# Patient Record
Sex: FEMALE | Race: WHITE | NOT HISPANIC OR LATINO | URBAN - METROPOLITAN AREA
[De-identification: names, ages, dates, MRNs, and addresses within clinical notes are randomized per-mention and may not be internally consistent; named-entity substitution may affect disease eponyms.]

---

## 2017-07-22 ENCOUNTER — EMERGENCY (EMERGENCY)
Facility: HOSPITAL | Age: 47
LOS: 1 days | End: 2017-07-22
Payer: COMMERCIAL

## 2017-07-22 PROCEDURE — 71010: CPT | Mod: 26

## 2017-07-22 PROCEDURE — 27808 TREATMENT OF ANKLE FRACTURE: CPT | Mod: 54

## 2017-07-22 PROCEDURE — 73590 X-RAY EXAM OF LOWER LEG: CPT | Mod: 26,RT

## 2017-07-22 PROCEDURE — 99284 EMERGENCY DEPT VISIT MOD MDM: CPT | Mod: 25

## 2019-11-15 PROBLEM — Z00.00 ENCOUNTER FOR PREVENTIVE HEALTH EXAMINATION: Status: ACTIVE | Noted: 2019-11-15

## 2020-05-06 ENCOUNTER — APPOINTMENT (OUTPATIENT)
Dept: ENDOCRINOLOGY | Facility: CLINIC | Age: 50
End: 2020-05-06
Payer: COMMERCIAL

## 2020-05-06 DIAGNOSIS — Z82.69 FAMILY HISTORY OF OTHER DISEASES OF THE MUSCULOSKELETAL SYSTEM AND CONNECTIVE TISSUE: ICD-10-CM

## 2020-05-06 PROCEDURE — 99205 OFFICE O/P NEW HI 60 MIN: CPT | Mod: 95

## 2020-05-06 NOTE — HISTORY OF PRESENT ILLNESS
[Teriparatide (Forteo)] : Teriparatide [Raloxifene (Evista)] : Raloxifene [FreeTextEntry1] : 48 yo female in generally good health. Had traumatic tib/fib fx 2017, ORIF at  [facility]. Eval by Dr Cassius Torres: osteoporosis  Treated with Forteo for 18 months. Repeat BMD reported as no improvement:\par BMD 2019 ZP Current images reviewed on-line; prior not available\par spine -2.8, prior -2/7\par Neck -2.7  prior -2/4\par total hip -3.1, prior -2.6 \par Pt then switched to Evista, partly for breast issues. Tolerating well, some mild hot flashes, no leg cramps or DVT. \par No unusual risk factors. Has one almond milk per day. + exercise\par Submitted labs 9/2019: subclinical hypothyroidism, no h/o prior thyroid disease, goiter, FHx, exposure to amio or lithium. No thyroid sx. TSH 6.47, normal free T4.  [Disordered Eating] : no history of disordered eating [Taking Steroids] : no history of taking steroids [Hyperparathyroidism] : no history of hyperparathyroidism [Diabetes] : no history of diabetes [Kidney Stones] : no history of kidney stones [Family History of Breast Cancer] : no family history of breast cancer [Family History of Hip Fracture] : no family history of hip fracture [Family History of Osteoporosis] : no family history of osteoporosis [History of Radiation Therapy] : no history of radiation therapy [History of Blood Clots] : no history of blood clots [High Fall Risk] : no fall risk

## 2020-05-06 NOTE — REASON FOR VISIT
[Consultation] : a consultation visit [Osteoporosis] : osteoporosis [FreeTextEntry2] : Katlin Soriano MD

## 2020-05-06 NOTE — ASSESSMENT
Last visit 04/11/2018   Next visit 07/26/2018. Noted that UDS is needed. [Bisphosphonate Therapy] : Risks and benefits of bisphosphonate therapy were  discussed with the patient including gastroesophageal irritation, osteonecrosis of the jaw, and atypical femur fractures, and acute phase reaction [Raloxifene Therapy] : Risks and benefits of Raloxifene therapy were discussed with the patient including blood clots, hot flashes, and cramps [FreeTextEntry1] : 50 yo female presents with h/o traumatic tib / fib fx, treated with Forteo for 18 months then changed to Evista. BMD 2019 showed possible decrease vs 2017. \par The patient was advised of the   risk of future fracture. Evista is not effective to treat hip osteoporosis  options reviewed. \par Pt elects to try Actonel 150 mg monthly. \par Can continue Evista for breast cancer prevention \par Recommend calcium 500 mg Vit D 2000\par \par Labs show subclinical hypothyroidism, no prior Dx. Cannot examine pt. Repeat TFTs with abs. No clear indication for Rx if stable. \par Pharmacological Management of Osteoporosis in Postmenopausal Women: An Endocrine Society  Clinical Practice Guideline. Tarun R, Dedrick CJ., Black DM, Roper AM, Murshaheed MH, El D. J Clin Endocrinol Metab. 2019  May 1;104(5):3105-3621 \par Thyroid hormones treatment for subclinical hypothyroidism:a clinical practice guideline.  MEHNAZ Bustillo  \par BMJ 2019;365:l2006

## 2020-05-06 NOTE — CONSULT LETTER
[Dear  ___] : Dear  [unfilled], [Consult Letter:] : I had the pleasure of evaluating your patient, [unfilled]. [Please see my note below.] : Please see my note below. [Consult Closing:] : Thank you very much for allowing me to participate in the care of this patient.  If you have any questions, please do not hesitate to contact me. [Sincerely,] : Sincerely, [FreeTextEntry2] : Katlin Soriano MD

## 2020-08-22 ENCOUNTER — TRANSCRIPTION ENCOUNTER (OUTPATIENT)
Age: 50
End: 2020-08-22

## 2020-10-12 ENCOUNTER — TRANSCRIPTION ENCOUNTER (OUTPATIENT)
Age: 50
End: 2020-10-12

## 2020-11-18 ENCOUNTER — TRANSCRIPTION ENCOUNTER (OUTPATIENT)
Age: 50
End: 2020-11-18

## 2020-12-29 ENCOUNTER — APPOINTMENT (OUTPATIENT)
Dept: ENDOCRINOLOGY | Facility: CLINIC | Age: 50
End: 2020-12-29
Payer: COMMERCIAL

## 2020-12-29 VITALS
TEMPERATURE: 98.6 F | OXYGEN SATURATION: 98 % | DIASTOLIC BLOOD PRESSURE: 70 MMHG | HEIGHT: 60 IN | WEIGHT: 105 LBS | HEART RATE: 85 BPM | SYSTOLIC BLOOD PRESSURE: 90 MMHG | BODY MASS INDEX: 20.62 KG/M2

## 2020-12-29 PROCEDURE — 99072 ADDL SUPL MATRL&STAF TM PHE: CPT

## 2020-12-29 PROCEDURE — ZZZZZ: CPT

## 2020-12-29 PROCEDURE — 77080 DXA BONE DENSITY AXIAL: CPT

## 2020-12-29 PROCEDURE — 99214 OFFICE O/P EST MOD 30 MIN: CPT | Mod: 25

## 2020-12-29 RX ORDER — NITROFURANTOIN (MONOHYDRATE/MACROCRYSTALS) 25; 75 MG/1; MG/1
100 CAPSULE ORAL
Qty: 10 | Refills: 0 | Status: COMPLETED | COMMUNITY
Start: 2020-12-27

## 2020-12-29 RX ORDER — CHOLECALCIFEROL (VITAMIN D3) 50 MCG
50 MCG TABLET ORAL
Refills: 0 | Status: ACTIVE | COMMUNITY

## 2020-12-29 RX ORDER — TOCOPHERSOLAN (VITAMIN E TPGS) 400/15ML
LIQUID (ML) ORAL
Refills: 0 | Status: ACTIVE | COMMUNITY

## 2020-12-29 RX ORDER — ONDANSETRON 4 MG/1
4 TABLET ORAL
Qty: 6 | Refills: 0 | Status: COMPLETED | COMMUNITY
Start: 2020-08-09

## 2021-05-24 NOTE — ADDENDUM
[FreeTextEntry1] : May 24, 2021\par Telephone\par Patient reports not tolerating Actonel having symptoms including upper GI symptoms and acute phase reaction-like symptoms every month.  Recommend switch to Prolia. Enrique Diaz MD

## 2021-05-24 NOTE — REASON FOR VISIT
[Follow - Up] : a follow-up visit [Osteoporosis] : osteoporosis [FreeTextEntry2] : initial consult was telehealth

## 2021-05-24 NOTE — PROCEDURE
[FreeTextEntry1] : Bone mineral density December 29, 2020\par Compared to outside study February 2019\par Spine -2.6 osteoporosis prior -2.5\par Total hip -3.2 osteoporosis prior -3.4\par Femoral neck -3.2 osteoporosis prior -2.7 change in position region of analysis\par Proximal radius -3.7, severe osteoporosis no prior

## 2021-05-24 NOTE — ASSESSMENT
[Bisphosphonate Therapy] : Risks and benefits of bisphosphonate therapy were  discussed with the patient including gastroesophageal irritation, osteonecrosis of the jaw, and atypical femur fractures, and acute phase reaction [Raloxifene Therapy] : Risks and benefits of Raloxifene therapy were discussed with the patient including blood clots, hot flashes, and cramps [FreeTextEntry1] : 49 yo female presents with h/o traumatic tib / fib fx, treated with Forteo for 18 months then changed to Evista. BMD 2019 showed possible decrease vs 2017. \par The patient was advised of the   risk of future fracture. Evista is not effective to treat hip osteoporosis  options reviewed. \par Pt began  Actonel 150 mg monthly. taking correctly tolerating well No UGI sx. No thigh pain. Last DDS few mos ago. No interval fractures. \par On mineral density 2020 shows significant osteoporosis with severe low bone density proximal radius no prior\par Can continue Evista for breast cancer prevention \par Recommend calcium 500 mg Vit D 2000\par \par Labs show subclinical hypothyroidism, no prior Dx. Cannot examine pt. Repeat TFTs  normal  . No clear indication for Rx  . \par Pharmacological Management of Osteoporosis in Postmenopausal Women: An Endocrine Society  Clinical Practice Guideline. Tarun R, Dedrick SALAZAR., Tito DM, Jacquelin AM, Vicky MH, El D. J Clin Endocrinol Metab. 2019  May 1;104(5):8272-7122 \par Thyroid hormones treatment for subclinical hypothyroidism:a clinical practice guideline.  MEHNAZ Bustillo  \par BMJ 2019;365:l2006

## 2021-05-24 NOTE — HISTORY OF PRESENT ILLNESS
[Teriparatide (Forteo)] : Teriparatide [Raloxifene (Evista)] : Raloxifene [FreeTextEntry1] : Began Actonel. taking correctly tolerating well No UGI sx. No thigh pain. Last DDS few mos ago. No interval fractures. \par Had traumatic tib/fib fx 2017, ORIF at  [facility]". Eval by Dr Cassius Torres: osteoporosis  Treated with Forteo for 18 months. Repeat BMD reported as no improvement:\par BMD 2019 ZP Current images reviewed on-line; prior not available\par spine -2.8, prior -2/7\par Neck -2.7  prior -2/4\par total hip -3.1, prior -2.6 \par Pt then switched to Evista, partly for breast issues. Tolerating well, some mild hot flashes, no leg cramps or DVT. \par No unusual risk factors. Has one almond milk per day. + exercise\par Submitted labs 9/2019: subclinical hypothyroidism, no h/o prior thyroid disease, goiter, FHx, exposure to amio or lithium. No thyroid sx. TSH 6.47, normal free T4.  Repeat TSH 8/2020 normal  [Disordered Eating] : no history of disordered eating [Taking Steroids] : no history of taking steroids [Hyperparathyroidism] : no history of hyperparathyroidism [Diabetes] : no history of diabetes [Kidney Stones] : no history of kidney stones [Family History of Breast Cancer] : no family history of breast cancer [Family History of Hip Fracture] : no family history of hip fracture [Family History of Osteoporosis] : no family history of osteoporosis [History of Radiation Therapy] : no history of radiation therapy [History of Blood Clots] : no history of blood clots [High Fall Risk] : no fall risk

## 2021-05-24 NOTE — PHYSICAL EXAM
[Alert] : alert [Well Nourished] : well nourished [No Acute Distress] : no acute distress [Well Developed] : well developed [Normal Sclera/Conjunctiva] : normal sclera/conjunctiva [EOMI] : extra ocular movement intact [No Proptosis] : no proptosis [Thyroid Not Enlarged] : the thyroid was not enlarged [No Thyroid Nodules] : no palpable thyroid nodules [Clear to Auscultation] : lungs were clear to auscultation bilaterally [Normal S1, S2] : normal S1 and S2 [Normal Rate] : heart rate was normal [Regular Rhythm] : with a regular rhythm [No Edema] : no peripheral edema [Normal Bowel Sounds] : normal bowel sounds [Not Tender] : non-tender [Not Distended] : not distended [Soft] : abdomen soft [Normal Anterior Cervical Nodes] : no anterior cervical lymphadenopathy [Normal Posterior Cervical Nodes] : no posterior cervical lymphadenopathy [No Spinal Tenderness] : no spinal tenderness [Spine Straight] : spine straight [No Stigmata of Cushings Syndrome] : no stigmata of Cushings Syndrome [Normal Gait] : normal gait [No Rash] : no rash [Normal Reflexes] : deep tendon reflexes were 2+ and symmetric [No Tremors] : no tremors [Oriented x3] : oriented to person, place, and time [Acanthosis Nigricans] : no acanthosis nigricans

## 2021-07-23 ENCOUNTER — TRANSCRIPTION ENCOUNTER (OUTPATIENT)
Age: 51
End: 2021-07-23

## 2021-07-23 ENCOUNTER — APPOINTMENT (OUTPATIENT)
Dept: ENDOCRINOLOGY | Facility: CLINIC | Age: 51
End: 2021-07-23
Payer: COMMERCIAL

## 2021-07-23 PROCEDURE — 99072 ADDL SUPL MATRL&STAF TM PHE: CPT

## 2021-07-23 PROCEDURE — 96401 CHEMO ANTI-NEOPL SQ/IM: CPT

## 2021-07-23 RX ADMIN — DENOSUMAB 0 MG/ML: 60 INJECTION SUBCUTANEOUS at 00:00

## 2021-07-26 ENCOUNTER — TRANSCRIPTION ENCOUNTER (OUTPATIENT)
Age: 51
End: 2021-07-26

## 2021-07-26 LAB
25(OH)D3 SERPL-MCNC: 57.1 NG/ML
ALBUMIN SERPL ELPH-MCNC: 4.5 G/DL
ALP BLD-CCNC: 64 U/L
ALT SERPL-CCNC: 18 U/L
ANION GAP SERPL CALC-SCNC: 11 MMOL/L
AST SERPL-CCNC: 23 U/L
BILIRUB SERPL-MCNC: 0.4 MG/DL
BUN SERPL-MCNC: 15 MG/DL
CALCIUM SERPL-MCNC: 9.5 MG/DL
CHLORIDE SERPL-SCNC: 101 MMOL/L
CO2 SERPL-SCNC: 26 MMOL/L
CREAT SERPL-MCNC: 0.75 MG/DL
GLUCOSE SERPL-MCNC: 69 MG/DL
POTASSIUM SERPL-SCNC: 4.5 MMOL/L
PROT SERPL-MCNC: 6.7 G/DL
SODIUM SERPL-SCNC: 138 MMOL/L

## 2021-07-26 RX ORDER — RALOXIFENE HYDROCHLORIDE 60 MG/1
60 TABLET, FILM COATED ORAL DAILY
Qty: 90 | Refills: 3 | Status: ACTIVE | COMMUNITY
Start: 1900-01-01 | End: 1900-01-01

## 2022-02-01 ENCOUNTER — LABORATORY RESULT (OUTPATIENT)
Age: 52
End: 2022-02-01

## 2022-02-01 ENCOUNTER — APPOINTMENT (OUTPATIENT)
Dept: ENDOCRINOLOGY | Facility: CLINIC | Age: 52
End: 2022-02-01
Payer: COMMERCIAL

## 2022-02-01 VITALS
TEMPERATURE: 98.2 F | BODY MASS INDEX: 19.92 KG/M2 | SYSTOLIC BLOOD PRESSURE: 98 MMHG | HEART RATE: 82 BPM | DIASTOLIC BLOOD PRESSURE: 58 MMHG | OXYGEN SATURATION: 98 % | WEIGHT: 102 LBS

## 2022-02-01 DIAGNOSIS — E03.8 OTHER SPECIFIED HYPOTHYROIDISM: ICD-10-CM

## 2022-02-01 DIAGNOSIS — Z91.89 OTHER SPECIFIED PERSONAL RISK FACTORS, NOT ELSEWHERE CLASSIFIED: ICD-10-CM

## 2022-02-01 PROCEDURE — 99214 OFFICE O/P EST MOD 30 MIN: CPT | Mod: 25

## 2022-02-01 PROCEDURE — 96401 CHEMO ANTI-NEOPL SQ/IM: CPT

## 2022-02-01 RX ORDER — RISEDRONATE SODIUM 150 MG/1
150 TABLET, FILM COATED ORAL
Qty: 3 | Refills: 3 | Status: DISCONTINUED | COMMUNITY
Start: 2020-05-06 | End: 2022-02-01

## 2022-02-01 RX ORDER — DENOSUMAB 60 MG/ML
60 INJECTION SUBCUTANEOUS
Qty: 1 | Refills: 0 | Status: COMPLETED | OUTPATIENT
Start: 2022-02-01

## 2022-02-01 RX ADMIN — DENOSUMAB 0 MG/ML: 60 INJECTION SUBCUTANEOUS at 00:00

## 2022-02-01 NOTE — HISTORY OF PRESENT ILLNESS
[Risedronate (Actonel)] : Risedronate [Teriparatide (Forteo)] : Teriparatide [Denosumab (Prolia)] : Denosumab [Raloxifene (Evista)] : Raloxifene [FreeTextEntry1] : No significant interval health changes. No interval surgery, hospitalizations, fractures, or change in medications.\par \par Pt began Actonel 5/2020. Took correctly, but had APR. BMD 12/2020 shows significant osteoporosis with severe low bone density proximal radius no prior. Pt transitioned to Prolia 7/2021. Tolerating well. No thigh pain, no interval fx. Normal Ca. Last DDS within past 6 months. No ONJ. Not planning major dental work.\par \par Had traumatic tib/fib fx 2017, [facility] at  [facility] Eval by Dr Cassius Torres: osteoporosis  Treated with Forteo for 18 months. Repeat BMD reported as no improvement:\par BMD 2019 ZP Current images reviewed on-line; prior not available\par spine -2.8, prior -2/7\par Neck -2.7  prior -2/4\par total hip -3.1, prior -2.6 \par Pt then switched to Evista, partly for breast issues. Taking correctly, tolerating well. No hot flashes, no leg cramps, no DVT. No interval fx. No unusual risk factors. Has one almond milk per day. + exercise\par \par Submitted labs 9/2019: subclinical hypothyroidism, no h/o prior thyroid disease, goiter, FHx, exposure to amio or lithium. No thyroid sx. TSH 6.47, normal free T4. Repeat TSH 8/2020 normal  [Disordered Eating] : no history of disordered eating [Taking Steroids] : no history of taking steroids [Hyperparathyroidism] : no history of hyperparathyroidism [Diabetes] : no history of diabetes [Kidney Stones] : no history of kidney stones [Family History of Breast Cancer] : no family history of breast cancer [Family History of Hip Fracture] : no family history of hip fracture [Family History of Osteoporosis] : no family history of osteoporosis [History of Radiation Therapy] : no history of radiation therapy [History of Blood Clots] : no history of blood clots [High Fall Risk] : no fall risk

## 2022-02-01 NOTE — END OF VISIT
[FreeTextEntry3] : I, Eamon Hameed, authored this note working as a medical scribe for Dr. Diaz.  02/01/2022. 10:45AM. This note was authored by the medical scribe for me. I have reviewed, edited, and revised the note as needed. I am in agreement with the exam findings, imaging findings, and treatment plan.  Enrique Diaz MD

## 2022-02-02 LAB
25(OH)D3 SERPL-MCNC: 35 NG/ML
ALBUMIN SERPL ELPH-MCNC: 4.5 G/DL
ALP BLD-CCNC: 66 U/L
ALT SERPL-CCNC: 14 U/L
ANION GAP SERPL CALC-SCNC: 13 MMOL/L
AST SERPL-CCNC: 23 U/L
BILIRUB SERPL-MCNC: 0.4 MG/DL
BUN SERPL-MCNC: 17 MG/DL
CALCIUM SERPL-MCNC: 8.6 MG/DL
CHLORIDE SERPL-SCNC: 103 MMOL/L
CO2 SERPL-SCNC: 25 MMOL/L
CREAT SERPL-MCNC: 0.64 MG/DL
GLUCOSE SERPL-MCNC: 51 MG/DL
POTASSIUM SERPL-SCNC: 4.3 MMOL/L
PROT SERPL-MCNC: 6.7 G/DL
SODIUM SERPL-SCNC: 142 MMOL/L
T3RU NFR SERPL: 1 TBI
T4 SERPL-MCNC: 5.1 UG/DL
TSH SERPL-ACNC: 1.71 UIU/ML

## 2022-02-08 LAB — COLLAGEN CTX SERPL-MCNC: 52 PG/ML

## 2022-08-02 ENCOUNTER — APPOINTMENT (OUTPATIENT)
Dept: ENDOCRINOLOGY | Facility: CLINIC | Age: 52
End: 2022-08-02

## 2022-08-16 ENCOUNTER — APPOINTMENT (OUTPATIENT)
Dept: ENDOCRINOLOGY | Facility: CLINIC | Age: 52
End: 2022-08-16

## 2022-08-16 VITALS — WEIGHT: 99 LBS | TEMPERATURE: 98.2 F | BODY MASS INDEX: 19.18 KG/M2 | HEIGHT: 60.25 IN

## 2022-08-16 LAB
25(OH)D3 SERPL-MCNC: 45.7 NG/ML
ALBUMIN SERPL ELPH-MCNC: 4.4 G/DL
ALP BLD-CCNC: 56 U/L
ALT SERPL-CCNC: 23 U/L
ANION GAP SERPL CALC-SCNC: 11 MMOL/L
AST SERPL-CCNC: 21 U/L
BILIRUB SERPL-MCNC: 0.5 MG/DL
BUN SERPL-MCNC: 19 MG/DL
CALCIUM SERPL-MCNC: 9.4 MG/DL
CHLORIDE SERPL-SCNC: 104 MMOL/L
CO2 SERPL-SCNC: 28 MMOL/L
CREAT SERPL-MCNC: 0.69 MG/DL
EGFR: 104 ML/MIN/1.73M2
GLUCOSE SERPL-MCNC: 80 MG/DL
POTASSIUM SERPL-SCNC: 4.4 MMOL/L
PROT SERPL-MCNC: 6.5 G/DL
SODIUM SERPL-SCNC: 143 MMOL/L

## 2022-08-16 PROCEDURE — 96401 CHEMO ANTI-NEOPL SQ/IM: CPT

## 2022-08-16 PROCEDURE — 77080 DXA BONE DENSITY AXIAL: CPT

## 2022-08-16 RX ORDER — DENOSUMAB 60 MG/ML
60 INJECTION SUBCUTANEOUS
Qty: 1 | Refills: 0 | Status: COMPLETED | OUTPATIENT
Start: 2022-08-12

## 2022-12-27 ENCOUNTER — APPOINTMENT (OUTPATIENT)
Dept: ENDOCRINOLOGY | Facility: CLINIC | Age: 52
End: 2022-12-27

## 2023-03-03 ENCOUNTER — APPOINTMENT (OUTPATIENT)
Dept: ENDOCRINOLOGY | Facility: CLINIC | Age: 53
End: 2023-03-03
Payer: COMMERCIAL

## 2023-03-03 PROCEDURE — 96401 CHEMO ANTI-NEOPL SQ/IM: CPT

## 2023-03-04 LAB
ALBUMIN SERPL ELPH-MCNC: 4.6 G/DL
ALP BLD-CCNC: 54 U/L
ALT SERPL-CCNC: 25 U/L
ANION GAP SERPL CALC-SCNC: 14 MMOL/L
AST SERPL-CCNC: 26 U/L
BILIRUB SERPL-MCNC: 0.5 MG/DL
BUN SERPL-MCNC: 18 MG/DL
CALCIUM SERPL-MCNC: 9.8 MG/DL
CHLORIDE SERPL-SCNC: 102 MMOL/L
CO2 SERPL-SCNC: 25 MMOL/L
CREAT SERPL-MCNC: 0.71 MG/DL
EGFR: 102 ML/MIN/1.73M2
GLUCOSE SERPL-MCNC: 85 MG/DL
POTASSIUM SERPL-SCNC: 4.6 MMOL/L
PROT SERPL-MCNC: 6.8 G/DL
SODIUM SERPL-SCNC: 140 MMOL/L

## 2023-07-10 ENCOUNTER — RX RENEWAL (OUTPATIENT)
Age: 53
End: 2023-07-10

## 2023-08-29 RX ORDER — DENOSUMAB 60 MG/ML
60 INJECTION SUBCUTANEOUS
Qty: 1 | Refills: 1 | Status: ACTIVE | COMMUNITY
Start: 2021-07-15 | End: 1900-01-01

## 2023-09-08 ENCOUNTER — APPOINTMENT (OUTPATIENT)
Dept: ENDOCRINOLOGY | Facility: CLINIC | Age: 53
End: 2023-09-08

## 2023-10-10 ENCOUNTER — APPOINTMENT (OUTPATIENT)
Dept: ENDOCRINOLOGY | Facility: CLINIC | Age: 53
End: 2023-10-10
Payer: COMMERCIAL

## 2023-10-10 VITALS
SYSTOLIC BLOOD PRESSURE: 94 MMHG | BODY MASS INDEX: 19.38 KG/M2 | WEIGHT: 100 LBS | OXYGEN SATURATION: 98 % | DIASTOLIC BLOOD PRESSURE: 68 MMHG | HEART RATE: 63 BPM | HEIGHT: 60.2 IN

## 2023-10-10 PROCEDURE — ZZZZZ: CPT

## 2023-10-10 PROCEDURE — 96401 CHEMO ANTI-NEOPL SQ/IM: CPT

## 2023-10-10 PROCEDURE — 99214 OFFICE O/P EST MOD 30 MIN: CPT | Mod: 25

## 2023-10-10 PROCEDURE — 77080 DXA BONE DENSITY AXIAL: CPT

## 2023-10-10 RX ORDER — DENOSUMAB 60 MG/ML
60 INJECTION SUBCUTANEOUS
Qty: 1 | Refills: 0 | Status: COMPLETED | OUTPATIENT
Start: 2023-10-10

## 2023-10-10 RX ADMIN — DENOSUMAB 60 MG/ML: 60 INJECTION SUBCUTANEOUS at 00:00

## 2024-04-17 ENCOUNTER — APPOINTMENT (OUTPATIENT)
Dept: ENDOCRINOLOGY | Facility: CLINIC | Age: 54
End: 2024-04-17
Payer: COMMERCIAL

## 2024-04-17 DIAGNOSIS — M81.0 AGE-RELATED OSTEOPOROSIS W/OUT CURRENT PATHOLOGICAL FRACTURE: ICD-10-CM

## 2024-04-17 PROCEDURE — 96401 CHEMO ANTI-NEOPL SQ/IM: CPT

## 2024-04-17 RX ORDER — DENOSUMAB 60 MG/ML
60 INJECTION SUBCUTANEOUS
Qty: 1 | Refills: 0 | Status: COMPLETED | OUTPATIENT
Start: 2024-04-16

## 2024-09-16 ENCOUNTER — RX RENEWAL (OUTPATIENT)
Age: 54
End: 2024-09-16

## 2024-11-25 ENCOUNTER — APPOINTMENT (OUTPATIENT)
Dept: ENDOCRINOLOGY | Facility: CLINIC | Age: 54
End: 2024-11-25
Payer: COMMERCIAL

## 2024-11-25 DIAGNOSIS — M81.0 AGE-RELATED OSTEOPOROSIS W/OUT CURRENT PATHOLOGICAL FRACTURE: ICD-10-CM

## 2024-11-25 PROCEDURE — 96401 CHEMO ANTI-NEOPL SQ/IM: CPT

## 2024-11-25 RX ORDER — DENOSUMAB 60 MG/ML
60 INJECTION SUBCUTANEOUS
Qty: 1 | Refills: 0 | Status: COMPLETED | OUTPATIENT
Start: 2024-11-20

## 2025-05-28 ENCOUNTER — APPOINTMENT (OUTPATIENT)
Dept: ENDOCRINOLOGY | Facility: CLINIC | Age: 55
End: 2025-05-28
Payer: COMMERCIAL

## 2025-05-28 VITALS
HEIGHT: 60.3 IN | WEIGHT: 100 LBS | SYSTOLIC BLOOD PRESSURE: 100 MMHG | OXYGEN SATURATION: 98 % | DIASTOLIC BLOOD PRESSURE: 62 MMHG | HEART RATE: 69 BPM | BODY MASS INDEX: 19.38 KG/M2

## 2025-05-28 DIAGNOSIS — M81.0 AGE-RELATED OSTEOPOROSIS W/OUT CURRENT PATHOLOGICAL FRACTURE: ICD-10-CM

## 2025-05-28 DIAGNOSIS — Z91.89 OTHER SPECIFIED PERSONAL RISK FACTORS, NOT ELSEWHERE CLASSIFIED: ICD-10-CM

## 2025-05-28 PROCEDURE — 96401 CHEMO ANTI-NEOPL SQ/IM: CPT

## 2025-05-28 PROCEDURE — ZZZZZ: CPT

## 2025-05-28 PROCEDURE — 77080 DXA BONE DENSITY AXIAL: CPT

## 2025-05-28 PROCEDURE — 99214 OFFICE O/P EST MOD 30 MIN: CPT | Mod: 25

## 2025-05-28 RX ORDER — DENOSUMAB 60 MG/ML
60 INJECTION SUBCUTANEOUS
Qty: 1 | Refills: 0 | Status: COMPLETED | OUTPATIENT
Start: 2025-05-28

## 2025-05-28 RX ADMIN — DENOSUMAB 0 MG/ML: 60 INJECTION SUBCUTANEOUS at 00:00

## 2025-07-10 NOTE — ASSESSMENT
[Denosumab Therapy] : Risks  and benefits of denosumab therapy were discussed with the patient including eczema, cellulitis, osteonecrosis of the jaw and atypical femur fractures Shower only/Do not submerge in water [Raloxifene Therapy] : Risks and benefits of Raloxifene therapy were discussed with the patient including blood clots, hot flashes, and cramps [FreeTextEntry1] : 50 yo female presents with osteoporosis\par \par H/o traumatic tib / fib fx, treated with Forteo for 18 months then changed to Evista. BMD 2019 showed possible decrease vs 2017. The patient was advised of the risk of future fracture. Evista is not effective to treat hip osteoporosis  options reviewed. Pt began Actonel 5/2020. Took correctly, but had APR. BMD 12/2020 shows significant osteoporosis with severe low bone density proximal radius no prior. Pt transitioned to Prolia 7/2021. Tolerating well. No thigh pain, no interval fx. Normal Ca. No ONJ. Continue Prolia, from [facility].\par \par Prior labs indicate elevated TSH c/w subclinical hypothyroidism, repeat labs were normal. Current recommendations concerning management of subclinical hypothyroidism reviewed in detail. If TSH remains only minimally elevated, there is no indication for beginning levothyroxine therapy. We will repeat TFT today.\par \par Thyroid hormones treatment for subclinical hypothyroidism:a clinical practice guideline. MEHNAZ Camarena et al BMJ 2019;365:l2006 \par \par Can continue Evista for breast cancer prevention.\par \par Request labs sent out.\par \par F/u in 6 months